# Patient Record
(demographics unavailable — no encounter records)

---

## 2017-09-01 NOTE — REP
Obstetric sonography:

 

History:  Supervision of pregnancy for anatomy.

 

Findings:  Scanning through the gravid uterus demonstrates a viable single

intrauterine gestation in a transverse head to the maternal left fetal lie.

Fetal motion is observed and heart rate is recorded at 149 beats per minute.  A

posterior grade 0 placenta is seen without evidence of previa.  Amniotic fluid is

subjectively normal.  Closed cervical length is 5.0 cm.  No extrauterine

abnormalities observed.  No fetal anomaly is seen.  Facial profile and fetal

spine are less than optimally seen due to fetal position.  The following

additional fetal anatomic structures are identified and felt to be

sonographically unremarkable:  Fetal cranium, choroid plexus, cavum, cerebellum

and posterior fossa, lungs, four-chamber heart with left and right ventricular

outflow tract views, diaphragm, left-sided stomach, abdominal wall cord

insertion, three-vessel umbilical cord, kidneys and bladder, upper and lower

extremities.

 

Biometry chart:

 

BPD 4.4 cm = 19 weeks 2 days

 

HC 16.3 cm = 19 weeks 1 day

 

AC 13.4 cm = 18 weeks 6 days

 

FL 3.0 cm = 19 weeks 2 days

 

HL 2.6 cm = 18 weeks 6 days

 

CD 2.0 cm = 19 weeks 2 days

 

HC/AC ratio normal 1.22.

 

Cephalic index normal 0.75.

 

Estimated fetal weight 272 grams 0 pounds 9 ounces 45th percentile for 19 weeks 1

day.

 

Impression:

 

Viable single intrauterine gestation at 19 weeks 1 day by today's composite

sonographic criteria.  YEN by today's sonography January 25, 2018.  Facial

profile and fetal spine less than optimally seen today.  Transverse lie.

 

 

Signed by

Christopher Pineda MD 09/01/2017 04:35 P

## 2017-09-26 NOTE — REP
Clinical:  Anatomical re-evaluation.

 

Comparison: 09/01/2017 .

 

Findings:

Examination demonstrates a single live intrauterine pregnancy in cephalic

presentation.  Fetal motion is identified by technologist.  Placenta is noted

posteriorly and grade zero without evidence for placenta previa or abruption.

Amniotic fluid volume is normal.  Cervix measures 3.4 cm in length and appears

closed.  No evidence for nuchal cord.

 

Gestational age by LMP 22 weeks 4 days with YEN 01/25/2018 .

Gestational age by current measurements 22 weeks 1 day with YEN a 01/28/2018 .

 

FHR equals 153 beats per minute.

Estimated fetal weight 511 grams ( 42nd percentile).

 

Anatomical assessment demonstrates normal structures including cranium, choroid

plexus, cavum, cerebellum/posterior fossa, facial features, lungs, four-chamber

heart/ left ventricular outflow tract, diaphragm, stomach, cord

insertion/three-vessel cord, kidneys/bladder, spine, and extremities.

 

Impression:

Single live intrauterine pregnancy in cephalic presentation demonstrating

appropriate interval growth.  In conjunction with prior examination anatomical

assessment is complete and normal.

 

 

Signed by

Abran Greenberg MD 09/26/2017 05:56 A